# Patient Record
Sex: MALE | Race: BLACK OR AFRICAN AMERICAN | Employment: UNEMPLOYED | URBAN - METROPOLITAN AREA
[De-identification: names, ages, dates, MRNs, and addresses within clinical notes are randomized per-mention and may not be internally consistent; named-entity substitution may affect disease eponyms.]

---

## 2017-09-13 ENCOUNTER — HOSPITAL ENCOUNTER (EMERGENCY)
Age: 22
Discharge: HOME | End: 2017-09-13
Attending: EMERGENCY MEDICINE
Payer: MEDICARE

## 2017-09-13 VITALS
TEMPERATURE: 98.7 F | OXYGEN SATURATION: 97 % | SYSTOLIC BLOOD PRESSURE: 110 MMHG | DIASTOLIC BLOOD PRESSURE: 77 MMHG | HEART RATE: 94 BPM | RESPIRATION RATE: 18 BRPM

## 2017-09-13 DIAGNOSIS — S05.12XA PERIORBITAL CONTUSION, LEFT, INITIAL ENCOUNTER: Primary | ICD-10-CM

## 2017-09-13 DIAGNOSIS — H11.32 SUBCONJUNCTIVAL HEMORRHAGE, LEFT: ICD-10-CM

## 2017-09-13 PROCEDURE — G0382 LEV 3 HOSP TYPE B ED VISIT: HCPCS

## 2017-09-13 RX ORDER — IBUPROFEN 800 MG/1
800 TABLET ORAL EVERY 8 HOURS PRN
Qty: 20 TABLET | Refills: 0 | Status: SHIPPED | OUTPATIENT
Start: 2017-09-13 | End: 2017-09-21

## 2017-09-13 ASSESSMENT — PAIN SCALES - GENERAL: PAINLEVEL_OUTOF10: 5

## 2017-09-13 ASSESSMENT — ENCOUNTER SYMPTOMS
SHORTNESS OF BREATH: 0
VOMITING: 0
TROUBLE SWALLOWING: 0
EYE DISCHARGE: 0
COUGH: 0
PHOTOPHOBIA: 0
EYE ITCHING: 0
EYE REDNESS: 1
NAUSEA: 0
EYE PAIN: 1
WHEEZING: 0

## 2017-09-13 ASSESSMENT — PAIN DESCRIPTION - LOCATION: LOCATION: EYE

## 2017-09-13 ASSESSMENT — PAIN DESCRIPTION - ORIENTATION: ORIENTATION: LEFT

## 2017-09-13 ASSESSMENT — PAIN DESCRIPTION - DESCRIPTORS: DESCRIPTORS: DISCOMFORT

## 2017-09-21 ENCOUNTER — HOSPITAL ENCOUNTER (EMERGENCY)
Age: 22
Discharge: HOME | End: 2017-09-21
Attending: EMERGENCY MEDICINE
Payer: MEDICARE

## 2017-09-21 ENCOUNTER — APPOINTMENT (OUTPATIENT)
Dept: GENERAL RADIOLOGY | Age: 22
End: 2017-09-21
Payer: MEDICARE

## 2017-09-21 VITALS
TEMPERATURE: 98.1 F | WEIGHT: 127 LBS | BODY MASS INDEX: 17.78 KG/M2 | RESPIRATION RATE: 18 BRPM | HEART RATE: 61 BPM | DIASTOLIC BLOOD PRESSURE: 79 MMHG | HEIGHT: 71 IN | OXYGEN SATURATION: 99 % | SYSTOLIC BLOOD PRESSURE: 127 MMHG

## 2017-09-21 DIAGNOSIS — T14.8XXA ABRASION: Primary | ICD-10-CM

## 2017-09-21 PROCEDURE — G0382 LEV 3 HOSP TYPE B ED VISIT: HCPCS

## 2017-09-21 PROCEDURE — 73552 X-RAY EXAM OF FEMUR 2/>: CPT

## 2017-09-21 RX ORDER — IBUPROFEN 800 MG/1
800 TABLET ORAL EVERY 8 HOURS PRN
Qty: 20 TABLET | Refills: 0 | Status: SHIPPED | OUTPATIENT
Start: 2017-09-21

## 2017-09-21 RX ORDER — GINSENG 100 MG
CAPSULE ORAL
Qty: 1 EACH | Refills: 0 | Status: SHIPPED | OUTPATIENT
Start: 2017-09-21 | End: 2017-10-01

## 2017-09-21 ASSESSMENT — PAIN DESCRIPTION - ORIENTATION: ORIENTATION: LEFT

## 2017-09-21 ASSESSMENT — ENCOUNTER SYMPTOMS
SHORTNESS OF BREATH: 0
VOMITING: 0
WHEEZING: 0
TROUBLE SWALLOWING: 0
COUGH: 0
BACK PAIN: 0
NAUSEA: 0

## 2017-09-21 ASSESSMENT — PAIN SCALES - GENERAL: PAINLEVEL_OUTOF10: 5

## 2017-09-21 ASSESSMENT — PAIN DESCRIPTION - LOCATION: LOCATION: LEG

## 2024-04-12 ENCOUNTER — HOSPITAL ENCOUNTER (EMERGENCY)
Age: 29
Discharge: HOME | End: 2024-04-12
Attending: EMERGENCY MEDICINE

## 2024-04-12 VITALS
TEMPERATURE: 98.2 F | BODY MASS INDEX: 18.36 KG/M2 | RESPIRATION RATE: 18 BRPM | OXYGEN SATURATION: 97 % | HEART RATE: 69 BPM | SYSTOLIC BLOOD PRESSURE: 118 MMHG | HEIGHT: 71 IN | DIASTOLIC BLOOD PRESSURE: 78 MMHG | WEIGHT: 131.17 LBS

## 2024-04-12 DIAGNOSIS — Z00.00 WELLNESS EXAMINATION: Primary | ICD-10-CM

## 2024-04-12 DIAGNOSIS — Z20.2 POTENTIAL EXPOSURE TO STD: ICD-10-CM

## 2024-04-12 LAB
BACTERIA URNS QL MICRO: ABNORMAL
BILIRUB UR QL STRIP: NEGATIVE
CASTS #/AREA URNS LPF: ABNORMAL /LPF (ref 0–8)
CLARITY UR: CLEAR
COLOR UR: YELLOW
EPI CELLS #/AREA URNS HPF: ABNORMAL /HPF (ref 0–5)
GLUCOSE UR STRIP-MCNC: NEGATIVE MG/DL
HGB UR QL STRIP.AUTO: NEGATIVE
KETONES UR STRIP-MCNC: ABNORMAL MG/DL
LEUKOCYTE ESTERASE UR QL STRIP: NEGATIVE
NITRITE UR QL STRIP: NEGATIVE
PH UR STRIP: 6 [PH] (ref 5–8)
PROT UR STRIP-MCNC: ABNORMAL MG/DL
RBC #/AREA URNS HPF: ABNORMAL /HPF (ref 0–4)
SP GR UR STRIP: 1.02 (ref 1–1.03)
UROBILINOGEN UR STRIP-ACNC: NORMAL EU/DL (ref 0–1)
WBC #/AREA URNS HPF: ABNORMAL /HPF (ref 0–5)

## 2024-04-12 PROCEDURE — 99283 EMERGENCY DEPT VISIT LOW MDM: CPT | Performed by: EMERGENCY MEDICINE

## 2024-04-12 PROCEDURE — 87491 CHLMYD TRACH DNA AMP PROBE: CPT

## 2024-04-12 PROCEDURE — 81001 URINALYSIS AUTO W/SCOPE: CPT

## 2024-04-12 PROCEDURE — 87591 N.GONORRHOEAE DNA AMP PROB: CPT

## 2024-04-12 ASSESSMENT — LIFESTYLE VARIABLES
HOW MANY STANDARD DRINKS CONTAINING ALCOHOL DO YOU HAVE ON A TYPICAL DAY: PATIENT DOES NOT DRINK
HOW OFTEN DO YOU HAVE A DRINK CONTAINING ALCOHOL: NEVER

## 2024-04-12 ASSESSMENT — PAIN - FUNCTIONAL ASSESSMENT: PAIN_FUNCTIONAL_ASSESSMENT: NONE - DENIES PAIN

## 2024-04-12 NOTE — ED PROVIDER NOTES
Concern    Not on file   Social History Narrative    ** Merged History Encounter **          Social Determinants of Health     Financial Resource Strain: Not on file   Food Insecurity: Not on file   Transportation Needs: Not on file   Physical Activity: Not on file   Stress: Not on file   Social Connections: Not on file   Intimate Partner Violence: Not on file   Housing Stability: Not on file       History reviewed. No pertinent family history.    Allergies:  Vancomycin    Home Medications:  Prior to Admission medications    Medication Sig Start Date End Date Taking? Authorizing Provider   ibuprofen (ADVIL;MOTRIN) 800 MG tablet Take 1 tablet by mouth every 8 hours as needed for Pain 9/21/17   Soni Márquez PA-C   oxyCODONE-acetaminophen (PERCOCET) 5-325 MG per tablet Take 1 tablet by mouth 2 times daily as needed for Pain 10/4/16   Miranda Jha MD   phenytoin (DILANTIN) 100 MG ER capsule Take 3 capsules by mouth nightly  Patient taking differently: Take 300 mg by mouth nightly Has not gotten filled or started 4/14/16   Juanito Quiroga MD   levETIRAcetam (KEPPRA) 1000 MG tablet Take 1 tablet by mouth 2 times daily  Patient taking differently: Take 500 mg by mouth 2 times daily  4/14/16   Juanito Quiroga MD       REVIEW OF SYSTEMS       Review of Systems see HPI    PHYSICAL EXAM      INITIAL VITALS:   /78   Pulse 69   Temp 98.2 °F (36.8 °C)   Resp 18   Ht 1.803 m (5' 11\")   Wt 59.5 kg (131 lb 2.8 oz)   SpO2 97%   BMI 18.30 kg/m²     Physical Exam  GCS 15, A&O x 4, VSS, PERRL, EOMI, HRRR, lungs CTA, no midline tenderness to the C/T force of L-spine without any step-off.  Abdomen: S, ND, NTTP.  Palpable radial and DP pulses.  No rash or urticaria on visible skin or palms of hands    DDX/DIAGNOSTIC RESULTS / EMERGENCY DEPARTMENT COURSE / MDM     Medical Decision Making  See ED    Amount and/or Complexity of Data Reviewed  Labs: ordered.        EKG    All EKG's are interpreted by the Emergency  [facility] [address]  [facility] [address]  [phone]    Schedule an appointment as soon as possible for a visit   If your results are positive      DISCHARGE MEDICATIONS:  Discharge Medication List as of 4/12/2024  7:08 PM          Flavio Yip DO  Emergency Medicine Resident    (Please note that portions of this note were completed with a voice recognition program.  Efforts were made to edit the dictations but occasionally words are mis-transcribed.)

## 2024-04-12 NOTE — ED NOTES
Pt is A+Ox4  Pt states he wants a check up for an STD  Pt states he is asymptomatic  Pt denies any known exposure  Pt denies dysuria  Pt denies any nausea  All questions answered and needs met at this time

## 2024-04-12 NOTE — ED PROVIDER NOTES
[facility] ED     Emergency Department     Faculty Attestation        I performed a history and physical examination of the patient and discussed management with the resident. I reviewed the resident’s note and agree with the documented findings and plan of care. Any areas of disagreement are noted on the chart. I was personally present for the key portions of any procedures. I have documented in the chart those procedures where I was not present during the key portions. I have reviewed the emergency nurses triage note. I agree with the chief complaint, past medical history, past surgical history, allergies, medications, social and family history as documented unless otherwise noted below.    For mid-level providers such as nurse practitioners as well as physicians assistants:    I have personally seen and evaluated the patient.    I find the patient's history and physical exam are consistent with NP/PA documentation.  I agree with the care provided, treatment rendered, disposition, & follow-up plan.     Additional findings are as noted.    Vital Signs: /78   Pulse 69   Temp 98.2 °F (36.8 °C)   Resp 18   Ht 1.803 m (5' 11\")   Wt 59.5 kg (131 lb 2.8 oz)   SpO2 97%   BMI 18.30 kg/m²   PCP:  Miranda Jha MD    Pertinent Comments:           Critical Care  None          Alli Peng MD    Attending Emergency Medicine Physician            Emil Peng MD  04/12/24 [id number]

## 2024-04-12 NOTE — DISCHARGE INSTRUCTIONS
-You were seen and evaluated by emergency medicine physicians at [facility].    -Please follow-up with your primary care physician and/or with the referrals to specialist.    -You were diagnosed with: Wellness exam, checkup for STD    -You presented to the emergency department for a wellness check for STD check/exposure.  You declined a genitourinary exam at this time.  -Urinalysis as well as gonorrhea and chlamydia test were ordered.  [facility].  If positive, please seek treatment with your PCP or return to urgent care or the emergency department.  -It is recommended that you use condoms during sexual intercourse and perform safe sex behaviors, including being in a monogamous relationship.    -Please return to the Emergency Department if you are experiencing the following symptoms acutely: Headache, fever, chills, nausea, vomiting, chest pain, shortness of breath, abdominal pain, change with urination, change with bowel movements, change in your skin/hair/nail, weakness, fatigue, altered mental status and/or any change from baseline health.    -Thank you for coming to [facility].

## 2024-04-15 LAB
CHLAMYDIA DNA UR QL NAA+PROBE: NEGATIVE
N GONORRHOEA DNA UR QL NAA+PROBE: NEGATIVE
SPECIMEN DESCRIPTION: NORMAL

## 2024-09-16 ENCOUNTER — HOSPITAL ENCOUNTER (EMERGENCY)
Age: 29
Discharge: LEFT BEFORE DISCHARGE | End: 2024-09-16
Attending: EMERGENCY MEDICINE

## 2024-09-16 VITALS
TEMPERATURE: 98.6 F | HEART RATE: 75 BPM | SYSTOLIC BLOOD PRESSURE: 137 MMHG | BODY MASS INDEX: 17.5 KG/M2 | DIASTOLIC BLOOD PRESSURE: 95 MMHG | HEIGHT: 71 IN | WEIGHT: 125 LBS | OXYGEN SATURATION: 100 % | RESPIRATION RATE: 19 BRPM

## 2024-09-16 DIAGNOSIS — Z53.21 ELOPED FROM EMERGENCY DEPARTMENT: Primary | ICD-10-CM

## 2024-09-16 LAB
BACTERIA URNS QL MICRO: NORMAL
BILIRUB UR QL STRIP: NEGATIVE
CASTS #/AREA URNS LPF: NORMAL /LPF (ref 0–8)
CLARITY UR: CLEAR
COLOR UR: YELLOW
EPI CELLS #/AREA URNS HPF: NORMAL /HPF (ref 0–5)
GLUCOSE UR STRIP-MCNC: NEGATIVE MG/DL
HGB UR QL STRIP.AUTO: NEGATIVE
KETONES UR STRIP-MCNC: NEGATIVE MG/DL
LEUKOCYTE ESTERASE UR QL STRIP: NEGATIVE
NITRITE UR QL STRIP: NEGATIVE
PH UR STRIP: 6 [PH] (ref 5–8)
PROT UR STRIP-MCNC: ABNORMAL MG/DL
RBC #/AREA URNS HPF: NORMAL /HPF (ref 0–4)
SP GR UR STRIP: 1.01 (ref 1–1.03)
UROBILINOGEN UR STRIP-ACNC: NORMAL EU/DL (ref 0–1)
WBC #/AREA URNS HPF: NORMAL /HPF (ref 0–5)

## 2024-09-16 PROCEDURE — 87591 N.GONORRHOEAE DNA AMP PROB: CPT

## 2024-09-16 PROCEDURE — 87661 TRICHOMONAS VAGINALIS AMPLIF: CPT

## 2024-09-16 PROCEDURE — 81001 URINALYSIS AUTO W/SCOPE: CPT

## 2024-09-16 PROCEDURE — 87491 CHLMYD TRACH DNA AMP PROBE: CPT

## 2024-09-16 PROCEDURE — 99283 EMERGENCY DEPT VISIT LOW MDM: CPT

## 2024-09-16 ASSESSMENT — PAIN - FUNCTIONAL ASSESSMENT: PAIN_FUNCTIONAL_ASSESSMENT: NONE - DENIES PAIN

## 2024-09-17 LAB
CHLAMYDIA DNA UR QL NAA+PROBE: ABNORMAL
N GONORRHOEA DNA UR QL NAA+PROBE: NEGATIVE
SPECIMEN DESCRIPTION: ABNORMAL

## 2024-09-18 ENCOUNTER — TELEPHONE (OUTPATIENT)
Age: 29
End: 2024-09-18

## 2024-09-18 LAB
SOURCE: ABNORMAL
TRICHOMONAS VAGINALI, MOLECULAR: POSITIVE

## 2024-09-18 RX ORDER — DOXYCYCLINE HYCLATE 100 MG
100 TABLET ORAL 2 TIMES DAILY
Qty: 14 TABLET | Refills: 0 | Status: SHIPPED | OUTPATIENT
Start: 2024-09-18 | End: 2024-09-25

## 2025-07-22 ENCOUNTER — HOSPITAL ENCOUNTER (EMERGENCY)
Age: 30
Discharge: HOME | End: 2025-07-22
Attending: EMERGENCY MEDICINE

## 2025-07-22 VITALS
TEMPERATURE: 98.6 F | SYSTOLIC BLOOD PRESSURE: 123 MMHG | DIASTOLIC BLOOD PRESSURE: 96 MMHG | HEART RATE: 86 BPM | RESPIRATION RATE: 16 BRPM | OXYGEN SATURATION: 99 %

## 2025-07-22 DIAGNOSIS — Z20.2 STD EXPOSURE: Primary | ICD-10-CM

## 2025-07-22 LAB
HIV 1+2 AB+HIV1 P24 AG SERPL QL IA: NONREACTIVE
T PALLIDUM AB SER QL IA: NONREACTIVE

## 2025-07-22 PROCEDURE — 6360000002 HC RX W HCPCS

## 2025-07-22 PROCEDURE — 96372 THER/PROPH/DIAG INJ SC/IM: CPT

## 2025-07-22 PROCEDURE — 87389 HIV-1 AG W/HIV-1&-2 AB AG IA: CPT

## 2025-07-22 PROCEDURE — 6370000000 HC RX 637 (ALT 250 FOR IP)

## 2025-07-22 PROCEDURE — 87491 CHLMYD TRACH DNA AMP PROBE: CPT

## 2025-07-22 PROCEDURE — 87591 N.GONORRHOEAE DNA AMP PROB: CPT

## 2025-07-22 PROCEDURE — 86780 TREPONEMA PALLIDUM: CPT

## 2025-07-22 PROCEDURE — 87661 TRICHOMONAS VAGINALIS AMPLIF: CPT

## 2025-07-22 PROCEDURE — 99284 EMERGENCY DEPT VISIT MOD MDM: CPT

## 2025-07-22 RX ORDER — CEFTRIAXONE 500 MG/1
500 INJECTION, POWDER, FOR SOLUTION INTRAMUSCULAR; INTRAVENOUS ONCE
Status: COMPLETED | OUTPATIENT
Start: 2025-07-22 | End: 2025-07-22

## 2025-07-22 RX ORDER — DOXYCYCLINE HYCLATE 100 MG
100 TABLET ORAL ONCE
Status: COMPLETED | OUTPATIENT
Start: 2025-07-22 | End: 2025-07-22

## 2025-07-22 RX ORDER — DOXYCYCLINE HYCLATE 100 MG
100 TABLET ORAL 2 TIMES DAILY
Qty: 14 TABLET | Refills: 0 | Status: SHIPPED | OUTPATIENT
Start: 2025-07-22 | End: 2025-07-29

## 2025-07-22 RX ADMIN — DOXYCYCLINE HYCLATE 100 MG: 100 TABLET, COATED ORAL at 15:32

## 2025-07-22 RX ADMIN — CEFTRIAXONE SODIUM 500 MG: 500 INJECTION, POWDER, FOR SOLUTION INTRAMUSCULAR; INTRAVENOUS at 15:32

## 2025-07-22 NOTE — DISCHARGE INSTRUCTIONS
You are seen in the emergency room for STD exposure.  [facility].  Please refrain from any sexual activity until you have finished the entire course of doxycycline.  Please follow-up with your primary care physician and notify any sexual partners.  If any of your symptoms worsen or develop any new symptoms of concern please return to the emergency room for reevaluation.

## 2025-07-22 NOTE — ED PROVIDER NOTES
[facility] EMERGENCY DEPARTMENT  Emergency Department Encounter  Emergency Medicine Resident     Pt Name:Leopoldo Traylor  MRN: [id number]  Birthdate 1995  Date of evaluation: 7/22/25  PCP:  No primary care provider on file.  Note Started: 4:30 PM EDT      CHIEF COMPLAINT       Chief Complaint   Patient presents with    Exposure to STD       HISTORY OF PRESENT ILLNESS  (Location/Symptom, Timing/Onset, Context/Setting, Quality, Duration, Modifying Factors, Severity.)      Leopoldo Traylor is a 29 y.o. male who presents with concern for STD.  His partner has a odor and he has noticed some pain at the end of urination and discharge at the urethral tip.  This started today.  Denies any fevers, chills, chest pain or shortness of breath.  No hematuria or urinary frequency.  History of chlamydia in the past that he was fully treated for while incarcerated.  No testicular pain or abdominal pain.    PAST MEDICAL / SURGICAL / SOCIAL / FAMILY HISTORY      has a past medical history of Epilepsy (HCC) and Seizures (HCC).     has a past surgical history that includes Finger surgery (Left, 09/22/2016).    Social History     Socioeconomic History    Marital status: Single     Spouse name: Not on file    Number of children: Not on file    Years of education: Not on file    Highest education level: Not on file   Occupational History    Not on file   Tobacco Use    Smoking status: Every Day     Types: Cigars    Smokeless tobacco: Never   Substance and Sexual Activity    Alcohol use: Yes     Alcohol/week: 0.0 standard drinks of alcohol     Comment: SOCIALLY    Drug use: Yes     Types: Marijuana (Weed)    Sexual activity: Not on file   Other Topics Concern    Not on file   Social History Narrative    ** Merged History Encounter **          Social Drivers of Health     Financial Resource Strain: Not on file   Food Insecurity: Not on file   Transportation Needs: Not on file   Physical Activity: Not on file   Stress: Not 
[facility]  Attending Emergency  Physician                Mejia Fox MD  07/22/25 [id number]

## 2025-07-23 LAB
CHLAMYDIA DNA UR QL NAA+PROBE: ABNORMAL
N GONORRHOEA DNA UR QL NAA+PROBE: NEGATIVE
SOURCE: NORMAL
SPECIMEN DESCRIPTION: ABNORMAL
TRICHOMONAS VAGINALIS, MOLECULAR: NEGATIVE

## 2025-07-24 ENCOUNTER — TELEPHONE (OUTPATIENT)
Age: 30
End: 2025-07-24

## 2025-07-24 NOTE — TELEPHONE ENCOUNTER
CLINICAL PHARMACY NOTE:  Telephone Follow-up for Positive STD Test    At the time of Leopoldo Traylor's visit to [facility] Emergency Department on 7/22/25 STD testing was performed. DNA testing was positive for Chlamydia.   Pregnancy status:  N/A.    While in the ED, patient was given doxycycline 100mg orally x 1 dose, ceftriaxone 500mg IM x 1 dose and a prescription for doxycyline 100 mg by mouth twice daily for 7 days.      Treatment appropriate, no follow up needed at this time.     Abbie Holloway, [facility], CACP  Clinical Pharmacist Medication Management  7/24/2025  8:17 AM